# Patient Record
Sex: FEMALE | Race: WHITE | Employment: UNEMPLOYED | URBAN - NONMETROPOLITAN AREA
[De-identification: names, ages, dates, MRNs, and addresses within clinical notes are randomized per-mention and may not be internally consistent; named-entity substitution may affect disease eponyms.]

---

## 2024-01-05 ENCOUNTER — OFFICE VISIT (OUTPATIENT)
Dept: URGENT CARE | Facility: CLINIC | Age: 54
End: 2024-01-05
Payer: COMMERCIAL

## 2024-01-05 VITALS
SYSTOLIC BLOOD PRESSURE: 118 MMHG | DIASTOLIC BLOOD PRESSURE: 64 MMHG | RESPIRATION RATE: 16 BRPM | TEMPERATURE: 97.8 F | OXYGEN SATURATION: 96 % | HEART RATE: 77 BPM

## 2024-01-05 DIAGNOSIS — J06.9 UPPER RESPIRATORY TRACT INFECTION, UNSPECIFIED TYPE: ICD-10-CM

## 2024-01-05 DIAGNOSIS — R05.1 ACUTE COUGH: Primary | ICD-10-CM

## 2024-01-05 PROCEDURE — 99203 OFFICE O/P NEW LOW 30 MIN: CPT

## 2024-01-05 PROCEDURE — S9088 SERVICES PROVIDED IN URGENT: HCPCS

## 2024-01-05 RX ORDER — FLUTICASONE PROPIONATE 50 MCG
2 SPRAY, SUSPENSION (ML) NASAL DAILY
COMMUNITY
Start: 2023-11-17

## 2024-01-05 RX ORDER — ASPIRIN 81 MG/1
81 TABLET ORAL DAILY
COMMUNITY
Start: 2023-12-20

## 2024-01-05 RX ORDER — BENZONATATE 200 MG/1
200 CAPSULE ORAL 3 TIMES DAILY PRN
Qty: 20 CAPSULE | Refills: 0 | Status: SHIPPED | OUTPATIENT
Start: 2024-01-05

## 2024-01-05 RX ORDER — ATORVASTATIN CALCIUM 40 MG/1
40 TABLET, FILM COATED ORAL
COMMUNITY
Start: 2023-12-20 | End: 2024-12-19

## 2024-01-05 RX ORDER — NIFEDIPINE 20 MG/1
CAPSULE ORAL
COMMUNITY

## 2024-01-05 RX ORDER — AZITHROMYCIN 250 MG/1
TABLET, FILM COATED ORAL
Qty: 6 TABLET | Refills: 0 | Status: SHIPPED | OUTPATIENT
Start: 2024-01-05 | End: 2024-01-09

## 2024-01-05 RX ORDER — LISINOPRIL 5 MG/1
5 TABLET ORAL DAILY
COMMUNITY

## 2024-01-05 RX ORDER — CLOPIDOGREL BISULFATE 75 MG/1
75 TABLET ORAL DAILY
COMMUNITY

## 2024-01-05 NOTE — PROGRESS NOTES
[facility]        NAME: Kianna Marx is a 53 y.o. female  : 1970    MRN: [id number]  DATE: 2024  TIME: 6:04 PM    Assessment and Plan   Acute cough [R05.1]  1. Acute cough  azithromycin (ZITHROMAX) 250 mg tablet    benzonatate (TESSALON) 200 MG capsule      2. Upper respiratory tract infection, unspecified type  azithromycin (ZITHROMAX) 250 mg tablet        4 to 5 days of upper respiratory congestion and pressure with cough.  Lungs are clear to auscultation without any wheezing, rales, rhonchi.  No respiratory distress.  Will try azithromycin and Tessalon Perles.  Advise follow-up with family doctor.  Advised to go to the emergency room if symptoms worsen.    Patient Instructions     Take prescribed medication as instructed.  Tylenol for pain or fever.  Make sure to stay well-hydrated and rest.  May do nasal saline rinses and Flonase twice daily for congestion.  May try warm tea with honey or teaspoon of honey to help with coughing irritation.  Follow-up with family doctor if no improvement.  Go to the emergency room if any symptoms worsen.  Follow up with PCP in 3-5 days.  Proceed to  ER if symptoms worsen.    Chief Complaint     Chief Complaint   Patient presents with    Cough     Lorenza ([id number])  Started 4 days ago  No OTC medication         History of Present Illness       53-year-old female here with 4 to 5 days of cough and upper respiratory congestion and pressure.  Sick contacts at home.  Has not taken anything over-the-counter.  Denies any fever, chills, earache, sore throat, chest pain, shortness of breath, abdominal pain.    Cough  Associated symptoms include rhinorrhea. Pertinent negatives include no ear pain, sore throat, shortness of breath or wheezing.       Review of Systems   Review of Systems   Constitutional: Negative.    HENT:  Positive for congestion, rhinorrhea and sinus pressure. Negative for ear discharge, ear pain and sore throat.     Respiratory:  Positive for cough. Negative for shortness of breath and wheezing.    Cardiovascular: Negative.    Gastrointestinal: Negative.    Musculoskeletal: Negative.    Neurological: Negative.          Current Medications       Current Outpatient Medications:     aspirin (ECOTRIN LOW STRENGTH) 81 mg EC tablet, Take 81 mg by mouth daily, Disp: , Rfl:     atorvastatin (LIPITOR) 40 mg tablet, Take 40 mg by mouth, Disp: , Rfl:     azithromycin (ZITHROMAX) 250 mg tablet, Take 2 tablets today then 1 tablet daily x 4 days, Disp: 6 tablet, Rfl: 0    benzonatate (TESSALON) 200 MG capsule, Take 1 capsule (200 mg total) by mouth 3 (three) times a day as needed for cough, Disp: 20 capsule, Rfl: 0    fluticasone (FLONASE) 50 mcg/act nasal spray, 2 sprays into each nostril daily, Disp: , Rfl:     lisinopril (ZESTRIL) 5 mg tablet, Take 5 mg by mouth daily, Disp: , Rfl:     metFORMIN (GLUCOPHAGE) 500 mg tablet, TOME DONNIE TABLETA 2 VECES AL ADA CON LAS COMIDAS, Disp: , Rfl:     NIFEdipine (PROCARDIA) 20 MG capsule, TOME DONNIE C PSULA TODOS LOS D AS, Disp: , Rfl:     ProAir  (90 Base) MCG/ACT inhaler, Inhale 2 puffs, Disp: , Rfl:     clopidogrel (PLAVIX) 75 mg tablet, Take 75 mg by mouth daily (Patient not taking: Reported on 1/5/2024), Disp: , Rfl:     Current Allergies     Allergies as of 01/05/2024    (No Known Allergies)            The following portions of the patient's history were reviewed and updated as appropriate: allergies, current medications, past family history, past medical history, past social history, past surgical history and problem list.     Past Medical History:   Diagnosis Date    Diabetes mellitus (HCC)     Hyperlipidemia     Hypertension        No past surgical history on file.    No family history on file.      Medications have been verified.        Objective   /64   Pulse 77   Temp 97.8 °F (36.6 °C)   Resp 16   SpO2 96%        Physical Exam     Physical Exam  Constitutional:        General: She is awake. She is not in acute distress.     Appearance: Normal appearance. She is not ill-appearing, toxic-appearing or diaphoretic.   HENT:      Head: Normocephalic and atraumatic.      Right Ear: Tympanic membrane, ear canal and external ear normal.      Left Ear: Tympanic membrane, ear canal and external ear normal.      Nose: Congestion and rhinorrhea present.      Mouth/Throat:      Mouth: Mucous membranes are moist.      Pharynx: Oropharynx is clear. No oropharyngeal exudate or posterior oropharyngeal erythema.   Eyes:      Extraocular Movements: Extraocular movements intact.      Conjunctiva/sclera: Conjunctivae normal.      Pupils: Pupils are equal, round, and reactive to light.   Cardiovascular:      Rate and Rhythm: Normal rate and regular rhythm.      Pulses: Normal pulses.      Heart sounds: Normal heart sounds. No murmur heard.     No friction rub. No gallop.   Pulmonary:      Effort: Pulmonary effort is normal. No tachypnea, bradypnea, accessory muscle usage, prolonged expiration, respiratory distress or retractions.      Breath sounds: Normal breath sounds and air entry. No stridor, decreased air movement or transmitted upper airway sounds. No decreased breath sounds, wheezing, rhonchi or rales.   Chest:      Chest wall: No tenderness.   Musculoskeletal:      Cervical back: Normal range of motion and neck supple. No tenderness.   Lymphadenopathy:      Cervical: No cervical adenopathy.   Skin:     General: Skin is warm and dry.      Capillary Refill: Capillary refill takes less than 2 seconds.      Findings: No rash.   Neurological:      General: No focal deficit present.      Mental Status: She is alert, oriented to person, place, and time and easily aroused. Mental status is at baseline.   Psychiatric:         Mood and Affect: Mood normal.         Behavior: Behavior normal. Behavior is cooperative.         Thought Content: Thought content normal.         Judgment: Judgment normal.

## 2024-01-05 NOTE — PATIENT INSTRUCTIONS
Take prescribed medication as instructed.  Tylenol for pain or fever.  Make sure to stay well-hydrated and rest.  May do nasal saline rinses and Flonase twice daily for congestion.  May try warm tea with honey or teaspoon of honey to help with coughing irritation.  Follow-up with family doctor if no improvement.  Go to the emergency room if any symptoms worsen.  Follow up with PCP in 3-5 days.  Proceed to  ER if symptoms worsen.  Infección respiratoria superior   LO QUE NECESITA SABER:   Alexandra infección de las vías respiratorias superiores también se conoce howie resfriado. Puede afectar zee nariz, garganta, oídos y los senos paranasales. Los síntomas del resfriado generalmente son más graves saba los primeros 3 a 5 días. La mayoría de las personas mejoran en 7 a 14 días. Puede que usted siga tosiendo por 2 a 3 semanas. Los resfriados son provocados por virus y no mejoran con antibióticos.  INSTRUCCIONES SOBRE EL CAROL HOSPITALARIA:   Llame al número de emergencias local (911 en los [place]) si:  Usted tiene dolor torácico y dificultad para respirar.      Regrese a la nicky de emergencias si:  Usted tiene fiebre más carol de 102 ºF (39 ºC).      Llame a zee médico si:  Tiene fiebre baja.    Zee garganta irritada empeora o usted ve manchas alberta o letha en zee garganta.    Catalina síntomas empeoran después de 3 a 5 días o no mejora en 14 días.    Usted tiene sarpullido en alguna parte de zee piel.    Usted tiene bultos grandes y sensibles en zee maria dolores    Usted tiene secreción espesa de color preston o amarillo proveniente de zee nariz.    Usted expectora mucosidad de color amarillo, preston o con penelope.    Usted tiene un grave dolor de oído.    Usted tiene preguntas o inquietudes acerca de zee condición o cuidado.    Medicamentos: Es posible que usted necesite alguno de los siguientes:  Los descongestivos ayudan a reducir la congestión nasal y ayudan a respirar más fácilmente. Si sondra pastillas descongestivas, pueden  causarle agitación o problemas para dormir. No use aerosol descongestionante por más de unos cuantos días.    Los jarabes para la tos ayudan a reducir la tos. Pregúntele a zee médico cuál tipo de medicamento para la tos es mejor para usted.    MELISSA howie el ibuprofeno, ayudan a disminuir la inflamación, el dolor y la fiebre. Los MELISSA pueden causar sangrado estomacal o problemas renales en ciertas personas. Si usted sondra un medicamento anticoagulante, siempre pregúntele a zee médico si los MELISSA son seguros para usted. Siempre anh la etiqueta de karthik medicamento y siga las instrucciones.    Acetaminofén corwin el dolor y baja la fiebre. Está disponible sin receta médica. Pregunte la cantidad y la frecuencia con que debe tomarlos. Siga las indicaciones. Anh las etiquetas de todos los demás medicamentos que esté usando para saber si también contienen acetaminofén, o pregunte a zee médico o farmacéutico. El acetaminofén puede causar daño en el hígado cuando no se sondra de forma correcta.    Vance sumeet medicamentos howie se le haya indicado. Consulte con zee médico si usted louisa que zee medicamento no le está ayudando o si presenta efectos secundarios. Infórmele al médico si usted es alérgico a algún medicamento. Mantenga steph lista actualizada de los medicamentos, las vitaminas y los productos herbales que sondra. Incluya los siguientes datos de los medicamentos: cantidad, frecuencia y motivo de administración. Traiga con usted la lista o los envases de las píldoras a sumeet citas de seguimiento. Lleve la lista de los medicamentos con usted en benjamin de steph emergencia.    Cuidados personales:  Descanse el mayor tiempo posible. Empiece a hacer un poco más día a día.    Vance más líquidos howie se le indique. Los líquidos le ayudarán a aflojar y disolver la mucosidad para que la pueda expectorar. Los líquidos ayudarán a evitar la deshidratación. Los líquidos que ayudan a prevenir la deshidratación pueden ser agua, jugo de fruta y caldo. No  tome líquidos que contienen cafeína. La cafeína puede aumentar el riesgo de deshidratación. Pregúntele a zee médico cuál es la cantidad de líquido que necesita ingerir a diario.    Alivie el dolor de garganta. Jesus gárgaras de agua tibia con sal. Prepare agua salina disolviendo ¼ de cucharada de sal a 1 taza de agua tibia. Usted puede también chupar dulces duros o pastillas para la garganta. Usted puede usar aerosol para el dolor de garganta.    Use un humidificador o vaporizador. Use un humidificador de kailey frío o un vaporizador para elevar la humedad en zee casa. [place] podría ayudarle a respirar más fácilmente y al mismo tiempo disminuir la tos.    Use gotas nasales de solución salina howie se le haya indicado. Estas ayudan a aliviar la congestión.    Aplique vaselina en la parte externa alrededor de las fosas nasales. Esta puede disminuir la irritación de sonarse la nariz.    No fume. La nicotina y otros químicos en los cigarrillos y cigarros pueden empeorar sumeet síntomas. También pueden causar infecciones howie la bronquitis o la neumonía. Pida información a zee médico si usted actualmente fuma y necesita ayuda para dejar de fumar. Los cigarrillos electrónicos o el tabaco sin humo igualmente contienen nicotina. Consulte con zee médico antes de utilizar estos productos.    Evite un resfriado:  Lávese las bhumika frecuentemente. Use agua y jabón cada vez que se lave las bhumika. Frótese las bhumika enjabonadas, entrelazando los dedos. Use los dedos de steph mano para restregar debajo de las uñas de la otra mano. Lávese saba al menos 20 segundos. Enjuague con agua corriente caliente saba varios segundos. Luego séquese las bhumika. Use desinfectante para bhumika si no hay agua y jabón disponibles. No se toque los ojos o la boca sin antes lavarse las bhumika.         Cúbrase al toser o estornudar. Use un pañuelo que cubra la boca y la nariz. Deseche el pañuelo usado de inmediato en la basura. Use el ángulo del brazo si no tiene un  pañuelo disponible. Lávese las bhumika con agua y jabón o use un desinfectante de bhumika. No se pare cerca de nadie que esté estornudando o tosiendo.    Trate de mantenerse alejado de los demás mientras esté enfermo. [place] es especialmente importante saba los primeros 2 o 3 días, cuando el virus se propaga más fácilmente. Espere hasta que la fiebre, la tos u otros síntomas desaparezcan antes de volver al trabajo u otras actividades regulares.    No comparta objetos mientras esté enfermo. [place] incluye alimentos, bebidas, utensilios para comer y platos.    Acuda a la consulta de control con zee médico según las indicaciones: Anote catalina preguntas para que se acuerde de hacerlas saba catalina visitas.  © Copyright [employer] 2023 Information is for End User's use only and may not be sold, redistributed or otherwise used for commercial purposes.  Esta información es sólo para uso en educación. Zee intención no es darle un consejo médico sobre enfermedades o tratamientos. Colsulte con zee médico, enfermera o farmacéutico antes de seguir cualquier régimen médico para saber si es seguro y efectivo para usted.  Tos aguda   LO QUE NECESITA SABER:   Steph tos aguda puede durar hasta 3 semanas. Las causas comunes de steph tos aguda incluyen un resfriado, alergias o steph infección pulmonar.  INSTRUCCIONES SOBRE EL CAROL HOSPITALARIA:   Regrese a la nicky de emergencias si:  Tiene dificultad para respirar o le falta el aire.    Usted tose penelope o nota penelope en zee mucosidad.    Se desmaya, se siente débil o mareado.    Le duele el pecho cuando respira hondo o tose.    Tiene respiración silbante.    Comuníquese con zee médico si:  Tiene fiebre.    La tos dura más de 4 semanas.    Catalina síntomas no mejoran con el tratamiento.    Usted tiene preguntas o inquietudes acerca de zee condición o cuidado.    Medicamentos:  Los medicamentos para detener la tos, disminuir la inflamación de las vías respiratorias o ayudar a abrirlas. Los medicamentos también  pueden despejar las vías respiratorias. Pregunte a zee médico qué medicamentos de venta lola puede regan. Si tiene steph infección causada por bacterias, es posible que necesite antibióticos.    Henriette sumeet medicamentos howie se le haya indicado. Consulte con zee médico si usted louisa que zee medicamento no le está ayudando o si presenta efectos secundarios. Infórmele al médico si usted es alérgico a algún medicamento. Mantenga steph lista actualizada de los medicamentos, las vitaminas y los productos herbales que sondra. Incluya los siguientes datos de los medicamentos: cantidad, frecuencia y motivo de administración. Traiga con usted la lista o los envases de las píldoras a sumeet citas de seguimiento. Lleve la lista de los medicamentos con usted en benjamin de steph emergencia.    El manejo de sumeet síntomas:  No fume y permanezca lejos de otras personas que fuman. La nicotina y otros químicos contenidos en los cigarrillos y puros pueden causar daño pulmonar y empeorar zee tos. Pida información a zee médico si usted actualmente fuma y necesita ayuda para dejar de fumar. Los cigarrillos electrónicos o el tabaco sin humo igualmente contienen nicotina. Consulte con zee médico antes de utilizar estos productos.    Henriette líquidos adicionales howie se le indique. Los líquidos le ayudarán a aflojar y disolver la mucosidad para que la pueda expectorar. Los líquidos ayudarán a evitar la deshidratación. Los mejores líquidos para regan son el agua, el jugo y el caldo. No tome líquidos que contienen cafeína. La cafeína puede aumentar el riesgo de deshidratación. Pregúntele a zee médico cuál es la cantidad de líquido que necesita ingerir a diario.    Repose según le indicaron. No realice actividades que empeoren la tos, howie el ejercicio físico.    Use un humidificador o vaporizador. Use un humidificador de kailey frío o un vaporizador para elevar la humedad en zee casa. [place] podría ayudarle a respirar más fácilmente y al mismo tiempo disminuir la  tos.    Ingiera de 2 a 5 ml de miel dos veces al día. La miel puede ayudar a diluir los mocos y disminuir la tos.    Utilice gotas o pastillas para la tos. Estas pueden ayudar a disminuir la irritación de la garganta y la tos.    Acuda a sumeet consultas de control con zee médico según le indicaron: Anote sumeet preguntas para que se acuerde de hacerlas saba sumeet visitas.  © Copyright Mer[employer] 2023 Information is for End User's use only and may not be sold, redistributed or otherwise used for commercial purposes.  Esta información es sólo para uso en educación. Zee intención no es darle un consejo médico sobre enfermedades o tratamientos. Colsulte con zee médico, enfermera o farmacéutico antes de seguir cualquier régimen médico para saber si es seguro y efectivo para usted.